# Patient Record
Sex: FEMALE | Race: WHITE
[De-identification: names, ages, dates, MRNs, and addresses within clinical notes are randomized per-mention and may not be internally consistent; named-entity substitution may affect disease eponyms.]

---

## 2019-01-16 ENCOUNTER — HOSPITAL ENCOUNTER (INPATIENT)
Dept: HOSPITAL 31 - C.EROB | Age: 34
LOS: 2 days | Discharge: HOME | End: 2019-01-18
Attending: OBSTETRICS & GYNECOLOGY | Admitting: OBSTETRICS & GYNECOLOGY
Payer: COMMERCIAL

## 2019-01-16 VITALS — BODY MASS INDEX: 35.1 KG/M2

## 2019-01-16 DIAGNOSIS — Z3A.40: ICD-10-CM

## 2019-01-16 LAB
BACTERIA #/AREA URNS HPF: (no result) /[HPF]
BASOPHILS # BLD AUTO: 0.1 K/UL (ref 0–0.2)
BASOPHILS NFR BLD: 0.9 % (ref 0–2)
BILIRUB UR-MCNC: NEGATIVE MG/DL
BUN SERPL-MCNC: 17 MG/DL (ref 7–17)
CALCIUM SERPL-MCNC: 9 MG/DL (ref 8.6–10.4)
EOSINOPHIL # BLD AUTO: 0.2 K/UL (ref 0–0.7)
EOSINOPHIL NFR BLD: 1.2 % (ref 0–4)
ERYTHROCYTE [DISTWIDTH] IN BLOOD BY AUTOMATED COUNT: 14.8 % (ref 11.5–14.5)
GFR NON-AFRICAN AMERICAN: > 60
GLUCOSE UR STRIP-MCNC: NORMAL MG/DL
HGB BLD-MCNC: 11.6 G/DL (ref 11–16)
LEUKOCYTE ESTERASE UR-ACNC: (no result) LEU/UL
LYMPHOCYTES # BLD AUTO: 2.1 K/UL (ref 1–4.3)
LYMPHOCYTES NFR BLD AUTO: 16.4 % (ref 20–40)
MCH RBC QN AUTO: 25.9 PG (ref 27–31)
MCHC RBC AUTO-ENTMCNC: 31.6 G/DL (ref 33–37)
MCV RBC AUTO: 81.8 FL (ref 81–99)
MONOCYTES # BLD: 0.9 K/UL (ref 0–0.8)
MONOCYTES NFR BLD: 7.1 % (ref 0–10)
NEUTROPHILS # BLD: 9.7 K/UL (ref 1.8–7)
NEUTROPHILS NFR BLD AUTO: 74.4 % (ref 50–75)
NRBC BLD AUTO-RTO: 0 % (ref 0–2)
PH UR STRIP: 6 [PH] (ref 5–8)
PLATELET # BLD: 253 K/UL (ref 130–400)
PMV BLD AUTO: 8.4 FL (ref 7.2–11.7)
PROT UR STRIP-MCNC: NEGATIVE MG/DL
RBC # BLD AUTO: 4.47 MIL/UL (ref 3.8–5.2)
RBC # UR STRIP: (no result) /UL
SP GR UR STRIP: 1 (ref 1–1.03)
SQUAMOUS EPITHIAL: 4 /HPF (ref 0–5)
UROBILINOGEN UR-MCNC: NORMAL MG/DL (ref 0.2–1)
WBC # BLD AUTO: 13.1 K/UL (ref 4.8–10.8)

## 2019-01-16 PROCEDURE — 0KQM0ZZ REPAIR PERINEUM MUSCLE, OPEN APPROACH: ICD-10-PCS | Performed by: OBSTETRICS & GYNECOLOGY

## 2019-01-16 NOTE — OBHP
===========================

Datetime: 2019 03:10

===========================

   

IP Adm Impression:  Term, intrauterine pregnancy

IP Admit Plan:  Initiate labor protocol

Admit Comment, IP Provider:  32 y/o  at 40.4 wks presented with c/o ctx. No c/o VB or LOF. + FM

      

      

   PMH: Deneis

   PSH: Denies

   OBH: Primigravid

      

   VE: 3/80/-2

   TOCO: Ctx q 3-5 min

   EFM: Cat 1

      

   Labs; Not available for review

      

   A?P: ADmit to L_D 

   Dr Goodrich

   IVF

   Labs

   GBS prophylaxis if needed

      

Pelvic Type - PN:  Adequate

Extremities - PN:  Normal

Abdomen - PN:  Normal

Back - PN:  Normal

Breast - PN:  Normal

Lungs - PN:  Normal

Heart - PN:  Normal

Thyroid - PN:  Normal

Neurologic - PN:  Normal

HEENT - PN:  Normal

General - PN:  Normal

Fetal Presentation-Admit:  Vertex

FHR - Baseline A Provider:  120

Membranes, Provider:  Intact

Contraction Comments Provider:  3-5

Comments, ACOG Physical Exam:  ABD: Soft, NT, EFW 7.5 lbs

Gestation - Est Wks by US:  40.4

Pool Provider:  Negative

EGA AdmitDate IP:  40.4

Vital Signs Provider:  Reviewed; Within Normal Limits

IP Chief Complaint:  Uterine contractions

NICHD Variability Prov Fetus A:  Moderate 6-25bpm

NICHD Accel Fetus A IP Provider:  15X15

FHR Category Provider Fetus A:  Category I

NICHD Decel Fetus A IP Provider:  None

Dilatation, Provider:  3

Effacement, Provider:  80

Station, Provider:  -2

Genitourinary Exam:  Normal

DTRs - PN:  Normal

## 2019-01-17 VITALS — RESPIRATION RATE: 18 BRPM

## 2019-01-17 LAB
BASOPHILS # BLD AUTO: 0 K/UL (ref 0–0.2)
BASOPHILS NFR BLD: 0.2 % (ref 0–2)
EOSINOPHIL # BLD AUTO: 0.1 K/UL (ref 0–0.7)
EOSINOPHIL NFR BLD: 0.5 % (ref 0–4)
ERYTHROCYTE [DISTWIDTH] IN BLOOD BY AUTOMATED COUNT: 15.3 % (ref 11.5–14.5)
HGB BLD-MCNC: 9.9 G/DL (ref 11–16)
LYMPHOCYTES # BLD AUTO: 2.2 K/UL (ref 1–4.3)
LYMPHOCYTES NFR BLD AUTO: 11.4 % (ref 20–40)
MCH RBC QN AUTO: 27.2 PG (ref 27–31)
MCHC RBC AUTO-ENTMCNC: 33.4 G/DL (ref 33–37)
MCV RBC AUTO: 81.5 FL (ref 81–99)
MONOCYTES # BLD: 1.2 K/UL (ref 0–0.8)
MONOCYTES NFR BLD: 6.3 % (ref 0–10)
NEUTROPHILS # BLD: 15.4 K/UL (ref 1.8–7)
NEUTROPHILS NFR BLD AUTO: 81.6 % (ref 50–75)
NRBC BLD AUTO-RTO: 0 % (ref 0–2)
PLATELET # BLD: 211 K/UL (ref 130–400)
PMV BLD AUTO: 8.4 FL (ref 7.2–11.7)
RBC # BLD AUTO: 3.62 MIL/UL (ref 3.8–5.2)
WBC # BLD AUTO: 18.9 K/UL (ref 4.8–10.8)

## 2019-01-17 RX ADMIN — Medication SCH TAB: at 09:59

## 2019-01-17 NOTE — OBDS
===================================

DELIVERY PERSONNEL

===================================

   

Delivery Doctor:  Kadie Goodrich MD

Circulator:  Kasie Salinas RN

Resident:  Carmenza FONG

   

===================================

MATERNAL INFORMATION

===================================

   

Delivery Anesthesia:  Epidural

Medications in Delivery:  Pitocin 20 Units

Estimated  Blood Loss (ml):  250

Placenta Cultured:  No

Maternal Complications:  None

RN Comments:  IUP 40.4 live baby boy born @ 1221. Apgars 9/9 .Nasal flaring and grunting noted post d
elivery. Dr Mccord called to delivery room. Pulse ox applied. Blow by oxygen given. Baby transferre
d to nursery for observation. 

Provider Comments:  pt fully dilated and pushing. Atruamtic, spontanouus deliveyr of head, no nuchal 
cord. atrumatic, spontaneous delivery of anteiro followed by posterior shoulder followed by delivery 
of the body. both oral and nasal passages of beryl baby were bulb suctioned. Umbilical cord was clamped
 and cut. baby was handed to mother on abdomen with rn assistnace. cord blood was collected adn sent 
x 2. Spontanoeu delivery of intact placent with membranes. fundus firm, second degree perineal lacera
tion noted adn repoared with 2-0 and 3-0 chormic. Good hemostasis, no complications.

      

   live male infant

   agpars 9,9

   ebl 250ml

   

===================================

LABOR SUMMARY

===================================

   

EDC:  2019 00:00

EDC:  2019 00:00

No. Babies in Womb:  1

 Attempted:  No

Labor Anesthesia:  Epidural

   

===================================

LABOR INFORMATION

===================================

   

Reason for Induction:  Not Applicable

Onset of Labor:  2019 03:00

Complete Dilatation:  2019 11:00

Oxytocin:  N/A

Group B Beta Strep:  Negative

Group B Beta Strep:  Negative

Steroids Given:  None

Reason Steroids Not Administered:  Not Applicable

   

===================================

MEMBRANES

===================================

   

Membranes Rupture Method:  Spontaneous

Membranes Rupture Method:  Spontaneous

Rupture of Membranes:  2019 08:45

Rupture of Membranes:  2019 08:45

Length of Rupture (hrs):  3.60

Length of Rupture (hrs):  3.60

Amniotic Fluid Color:  Clear

Amniotic Fluid Color:  Clear

Amniotic Fluid Amount:  Small

Amniotic Fluid Amount:  Small

Amniotic Fluid Odor:  Normal

Amniotic Fluid Odor:  Normal

   

===================================

STAGES OF LABOR

===================================

   

Stage 1 hrs:  8

Stage 1 min:  0

Stage 2 hrs:  1

Stage 2 min:  21

Stage 3 hrs:  0

Stage 3 min:  10

Total Time in Labor hrs:  9

Total Time in Labor min:  31

   

===================================

VAGINAL DELIVERY

===================================

   

Episiotomy:  None

Laceration Extension:  Second Degree

Laceration Type:  Perineal

Laceration Repair:  Yes

Initial Vag Sponge Count:  10

Final Vag Sponge Count:  10

Initial Vag Sharps Count:  2

Final Vag Sharps Count:  2

Sponge Count Correct:  Yes

Sharps Count Correct:  Yes

   

===================================

BABY A INFORMATION

===================================

   

Infant Delivery Date/Time:  2019 12:21

Method of Delivery:  Vaginal

Born in Route :  No

:  N/A

Forceps:  N/A

Vacuum Extraction:  N/A

Shoulder Dystocia :  No

   

===================================

SHOULDER DYSTOCIA BABY A

===================================

   

Infant Delivery Date/Time:  2019 12:21

   

===================================

PRESENTATION/POSITION BABY A

===================================

   

Presentation:  Cephalic

Cephalic Presentation:  Vertex

Vertex Position:  Left Occipital Anterior

Breech Presentation:  N/A

   

===================================

PLACENTA INFORMATION BABY A

===================================

   

Placenta Delivery Time :  2019 12:31

Placenta Method of Delivery:  Spontaneous

Placenta Status:  Delivered

   

===================================

APGAR SCORES BABY A

===================================

   

Heart Rate 1 min:  >100 bpm

Resp Effort 1 min:  Good Cry

Reflex Irritability 1 min:  Cough or Sneeze or Pulls Away

Muscle Tone 1 min:  Active Motion

Color 1 min:  Body Pink, Extremities Blue

APGAR SCORE 1 MIN:  9

Heart Rate 5 min:  >100 bpm

Resp Effort 5 min:  Good Cry

Reflex Irritability 5 min:  Cough or Sneeze or Pulls Away

Muscle Tone 5 min:  Active Motion

Color 5 min:  Body Pink, Extremities Blue

APGAR SCORE 5 MIN:  9

   

===================================

INFANT INFORMATION BABY A

===================================

   

Gestational Age at Delivery:  40.4

Gestational Status:  Term

Infant Outcome :  Liveborn

Infant Condition :  Stable

Infant Sex:  Male

   

===================================

IDENTIFICATION/MEDS BABY A

===================================

   

ID Band Number:  [id number]

ID Band Location:  Left Leg; Left Arm



Sensor Applied:  Yes

Sensor Number:  [id number]

Sensor Location :  Cord Clamp

   

===================================

WEIGHT/LENGTH BABY A

===================================

   

Infant Birthweight (gms):  3525

Infant Weight (lb):  7

Infant Weight (oz):  12

Infant Length Inches:  20.00

Infant Length cms:  50.8

   

===================================

CORD INFORMATION BABY A

===================================

   

No. Cord Vessels:  3

Nuchal Cord :  N/A

Cord Blood Taken:  Yes

Infant Suction:  Mouth; Nose

   

===================================

ASSESSMENT BABY A

===================================

   

Infant Complications:  None

Physical Findings at Delivery:  Caput Succedaneum; Molding of the Head

Infant Respirations:  Grunting; Intercostal Retractions; Nasal Flaring

Neonatologist/ALS Called :  Yes

Infant Care By:  Dr. Mccord
- - -

## 2019-01-18 VITALS
HEART RATE: 80 BPM | SYSTOLIC BLOOD PRESSURE: 121 MMHG | OXYGEN SATURATION: 98 % | TEMPERATURE: 97 F | DIASTOLIC BLOOD PRESSURE: 63 MMHG

## 2019-01-18 LAB
BASOPHILS # BLD AUTO: 0.1 K/UL (ref 0–0.2)
BASOPHILS NFR BLD: 0.4 % (ref 0–2)
EOSINOPHIL # BLD AUTO: 0.2 K/UL (ref 0–0.7)
EOSINOPHIL NFR BLD: 1.2 % (ref 0–4)
ERYTHROCYTE [DISTWIDTH] IN BLOOD BY AUTOMATED COUNT: 15.4 % (ref 11.5–14.5)
HGB BLD-MCNC: 10.8 G/DL (ref 11–16)
LYMPHOCYTES # BLD AUTO: 1.9 K/UL (ref 1–4.3)
LYMPHOCYTES NFR BLD AUTO: 14.4 % (ref 20–40)
MCH RBC QN AUTO: 27.2 PG (ref 27–31)
MCHC RBC AUTO-ENTMCNC: 33.3 G/DL (ref 33–37)
MCV RBC AUTO: 81.7 FL (ref 81–99)
MONOCYTES # BLD: 1 K/UL (ref 0–0.8)
MONOCYTES NFR BLD: 7.2 % (ref 0–10)
NEUTROPHILS # BLD: 10.1 K/UL (ref 1.8–7)
NEUTROPHILS NFR BLD AUTO: 76.8 % (ref 50–75)
NRBC BLD AUTO-RTO: 0.1 % (ref 0–2)
PLATELET # BLD: 247 K/UL (ref 130–400)
PMV BLD AUTO: 8.1 FL (ref 7.2–11.7)
RBC # BLD AUTO: 3.96 MIL/UL (ref 3.8–5.2)
WBC # BLD AUTO: 13.2 K/UL (ref 4.8–10.8)

## 2019-01-18 RX ADMIN — Medication SCH TAB: at 10:14

## 2019-01-18 NOTE — OBPPN
===========================

Datetime: 01/18/2019 12:21

===========================

   

PP Pain Prov:  Within normal limits

PP Nausea Prov:  Denies

PP Flatus Prov:  Yes

PP BM Prov:  No

PP Breasts Prov:  Normal

PP Heart Prov:  Normal

PP Lungs Prov:  Normal

PP Abdomen/Uterus Prov:  Normal

PP Lochia Prov:  Normal

PP Vulva/Perineum Prov:  Normal

PP CVA Tenderness Prov:  Normal

PP Extremities Prov:  Normal

PP C/S Incision Prov:  Not Applicable

PP Breastfeeding Progress Prov:  Normal

PP Impression Prov:  Normal postpartum progression

PP Plan Prov:  Discharge

PP Progress Note Prov:  pt seen and emained

   vss

   pe see above

      

   ap s/ pnsvd ppd #2 doign well

   dc home

   rto 6 weeks

IP PP Procedures:  None

Vital Signs Provider PP:  Reviewed; Within Normal Limits

## 2019-01-18 NOTE — OBDCSUM
===========================

Datetime: 01/18/2019 12:22

===========================

   

Discharged to, Provider:  Home

Follow up at, Provider:  Dr Goodrich

Disch Instr Activity:  Normal activity

Disch Instr Diet:  Regular

Discharge Instructions, Provider:  Routine instructions given

Discharge Diagnosis, Provider:  Term Pregnancy Delivered

Discharge Time:  01/18/2019 12:22

Follow up in weeks, Provider:  6 weeks

Disch Referrals:  None

Contraception discussed, Prov:  Yes

Disch Activity Restrictions:  No sexual activity; Nothing in vagina - [place], tampons, douche

Discharge Comment, Provider:  precautiong iven

Contraception after Delivery:  Not Planning to Use